# Patient Record
Sex: MALE | Race: BLACK OR AFRICAN AMERICAN | NOT HISPANIC OR LATINO | Employment: UNEMPLOYED | URBAN - METROPOLITAN AREA
[De-identification: names, ages, dates, MRNs, and addresses within clinical notes are randomized per-mention and may not be internally consistent; named-entity substitution may affect disease eponyms.]

---

## 2023-08-01 ENCOUNTER — HOSPITAL ENCOUNTER (EMERGENCY)
Facility: HOSPITAL | Age: 33
Discharge: HOME | End: 2023-08-01
Attending: EMERGENCY MEDICINE
Payer: MEDICAID

## 2023-08-01 VITALS
DIASTOLIC BLOOD PRESSURE: 72 MMHG | SYSTOLIC BLOOD PRESSURE: 112 MMHG | HEIGHT: 68 IN | BODY MASS INDEX: 30.31 KG/M2 | TEMPERATURE: 98 F | RESPIRATION RATE: 16 BRPM | WEIGHT: 200 LBS | HEART RATE: 76 BPM | OXYGEN SATURATION: 100 %

## 2023-08-01 DIAGNOSIS — R19.7 DIARRHEA, UNSPECIFIED TYPE: ICD-10-CM

## 2023-08-01 DIAGNOSIS — R10.9 ABDOMINAL PAIN, UNSPECIFIED ABDOMINAL LOCATION: Primary | ICD-10-CM

## 2023-08-01 LAB
ALBUMIN SERPL BCP-MCNC: 4.2 G/DL (ref 3.5–5.2)
ALP SERPL-CCNC: 71 U/L (ref 55–135)
ALT SERPL W/O P-5'-P-CCNC: 27 U/L (ref 10–44)
ANION GAP SERPL CALC-SCNC: 9 MMOL/L (ref 8–16)
AST SERPL-CCNC: 23 U/L (ref 10–40)
BASOPHILS # BLD AUTO: 0.05 K/UL (ref 0–0.2)
BASOPHILS NFR BLD: 1 % (ref 0–1.9)
BILIRUB SERPL-MCNC: 0.6 MG/DL (ref 0.1–1)
BUN SERPL-MCNC: 17 MG/DL (ref 6–20)
CALCIUM SERPL-MCNC: 9.2 MG/DL (ref 8.7–10.5)
CHLORIDE SERPL-SCNC: 105 MMOL/L (ref 95–110)
CO2 SERPL-SCNC: 26 MMOL/L (ref 23–29)
CREAT SERPL-MCNC: 1.2 MG/DL (ref 0.5–1.4)
CRP SERPL-MCNC: 8.5 MG/L (ref 0–8.2)
DIFFERENTIAL METHOD: NORMAL
EOSINOPHIL # BLD AUTO: 0.1 K/UL (ref 0–0.5)
EOSINOPHIL NFR BLD: 1.7 % (ref 0–8)
ERYTHROCYTE [DISTWIDTH] IN BLOOD BY AUTOMATED COUNT: 13.2 % (ref 11.5–14.5)
EST. GFR  (NO RACE VARIABLE): >60 ML/MIN/1.73 M^2
GLUCOSE SERPL-MCNC: 72 MG/DL (ref 70–110)
HCT VFR BLD AUTO: 46.8 % (ref 40–54)
HGB BLD-MCNC: 15.8 G/DL (ref 14–18)
IMM GRANULOCYTES # BLD AUTO: 0.01 K/UL (ref 0–0.04)
IMM GRANULOCYTES NFR BLD AUTO: 0.2 % (ref 0–0.5)
LIPASE SERPL-CCNC: 38 U/L (ref 4–60)
LYMPHOCYTES # BLD AUTO: 2.3 K/UL (ref 1–4.8)
LYMPHOCYTES NFR BLD: 43.2 % (ref 18–48)
MCH RBC QN AUTO: 30.9 PG (ref 27–31)
MCHC RBC AUTO-ENTMCNC: 33.8 G/DL (ref 32–36)
MCV RBC AUTO: 92 FL (ref 82–98)
MONOCYTES # BLD AUTO: 0.6 K/UL (ref 0.3–1)
MONOCYTES NFR BLD: 11.2 % (ref 4–15)
NEUTROPHILS # BLD AUTO: 2.2 K/UL (ref 1.8–7.7)
NEUTROPHILS NFR BLD: 42.7 % (ref 38–73)
NRBC BLD-RTO: 0 /100 WBC
PLATELET # BLD AUTO: 180 K/UL (ref 150–450)
PMV BLD AUTO: 11.3 FL (ref 9.2–12.9)
POTASSIUM SERPL-SCNC: 3.6 MMOL/L (ref 3.5–5.1)
PROT SERPL-MCNC: 7.7 G/DL (ref 6–8.4)
RBC # BLD AUTO: 5.11 M/UL (ref 4.6–6.2)
SODIUM SERPL-SCNC: 140 MMOL/L (ref 136–145)
WBC # BLD AUTO: 5.25 K/UL (ref 3.9–12.7)

## 2023-08-01 PROCEDURE — 80053 COMPREHEN METABOLIC PANEL: CPT | Performed by: PHYSICIAN ASSISTANT

## 2023-08-01 PROCEDURE — 63600175 PHARM REV CODE 636 W HCPCS: Performed by: EMERGENCY MEDICINE

## 2023-08-01 PROCEDURE — 83690 ASSAY OF LIPASE: CPT | Performed by: PHYSICIAN ASSISTANT

## 2023-08-01 PROCEDURE — 86140 C-REACTIVE PROTEIN: CPT | Performed by: PHYSICIAN ASSISTANT

## 2023-08-01 PROCEDURE — 25000003 PHARM REV CODE 250: Performed by: EMERGENCY MEDICINE

## 2023-08-01 PROCEDURE — 85025 COMPLETE CBC W/AUTO DIFF WBC: CPT | Performed by: PHYSICIAN ASSISTANT

## 2023-08-01 PROCEDURE — 99284 EMERGENCY DEPT VISIT MOD MDM: CPT

## 2023-08-01 PROCEDURE — 96374 THER/PROPH/DIAG INJ IV PUSH: CPT

## 2023-08-01 RX ORDER — ONDANSETRON 2 MG/ML
4 INJECTION INTRAMUSCULAR; INTRAVENOUS
Status: COMPLETED | OUTPATIENT
Start: 2023-08-01 | End: 2023-08-01

## 2023-08-01 RX ORDER — ONDANSETRON 4 MG/1
4 TABLET, ORALLY DISINTEGRATING ORAL 3 TIMES DAILY PRN
Qty: 12 TABLET | Refills: 0 | Status: SHIPPED | OUTPATIENT
Start: 2023-08-01

## 2023-08-01 RX ADMIN — SODIUM CHLORIDE 1000 ML: 9 INJECTION, SOLUTION INTRAVENOUS at 03:08

## 2023-08-01 RX ADMIN — ONDANSETRON HYDROCHLORIDE 4 MG: 2 SOLUTION INTRAMUSCULAR; INTRAVENOUS at 03:08

## 2023-08-01 NOTE — ED PROVIDER NOTES
Encounter Date: 8/1/2023       History     Chief Complaint   Patient presents with    Abdominal Pain     Umbilical, abd pain with n/v+ and diarrhea since this am. No distress now; 4/10 pain.     Back Pain     Lumbar, back pain x1 month that has worsened over time and not responding to aleeve. 4/10 pain.      HPI  Shimon is a 32-year-old male who presents with periumbilical abdominal pain, one episode of NBNB vomiting and 5 episodes of nonbloody diarrhea that started today.  No known sick contacts.  Pain is not severe.  No fever or chills.  No recent international travel.  Has not been eating or drinking from abnormal sources for him.  Denies dysuria.    Also complains of a lump on his back that hurts slightly that has been there for months, no worse today than usual.  Review of patient's allergies indicates:  No Known Allergies  No past medical history on file.  No past surgical history on file.  No family history on file.     Review of Systems    Physical Exam     Initial Vitals [08/01/23 1351]   BP Pulse Resp Temp SpO2   (!) 144/69 84 20 98.2 °F (36.8 °C) 98 %      MAP       --         Physical Exam  General: Awake and alert, well-nourished  HENT: moist mucous membranes  Eyes: No conjunctival injection  Pulm: CTAB, no increased work of breathing  CV: Regular rate and rhythm, no murmur noted  Abdomen: Nondistended, TTP in periumbilical region, mild RLQ tenderness, no pain with heel strike or hopping.   MSK: No LE edema, no midline spinal tenderness  Skin: No rash noted, he has a 2x2cm mobile rubbery subcutaneous spherical mass near the mid thoracic spine.  Most likely a dermoid cyst or possibly lipoma, it is not erythematous or significantly tender.  Neuro: No facial asymmetry, grossly normal movements of arms and legs  Psychiatric: Cooperative    ED Course   Procedures  Labs Reviewed   C-REACTIVE PROTEIN - Abnormal; Notable for the following components:       Result Value    CRP 8.5 (*)     All other components  within normal limits   CBC W/ AUTO DIFFERENTIAL   COMPREHENSIVE METABOLIC PANEL   LIPASE   C-REACTIVE PROTEIN          Imaging Results    None          Medications   sodium chloride 0.9% bolus 1,000 mL 1,000 mL (1,000 mLs Intravenous New Bag 8/1/23 1540)   ondansetron injection 4 mg (4 mg Intravenous Given 8/1/23 1539)     Medical Decision Making:   Differential Diagnosis:   Gastroenteritis, mesenteric adenitis, appendicitis, SBO unlikely,  biliary pathology, UTI, pancreatitis  ED Management:  Pt overall well appearing, only mild tenderness of abdomen, mostly in periumbilical area.  Vitals reassuring.  Clinical picture most consistent with gastroenteritis.  However given mild RLQ tenderness will do labs to further eval for possibility of early appendicitis.  CBC, CMP, lipase, CRP overall reassuring.  Pt feels well to go home.  Zofran prescription given.  Recommended repeat eval tomorrow by a physician if pain continues to ensure not early appendicitis.  Return precautions given.                           Clinical Impression:   Final diagnoses:  [R10.9] Abdominal pain, unspecified abdominal location (Primary)  [R19.7] Diarrhea, unspecified type        ED Disposition Condition    Discharge Stable          ED Prescriptions       Medication Sig Dispense Start Date End Date Auth. Provider    ondansetron (ZOFRAN-ODT) 4 MG TbDL Take 1 tablet (4 mg total) by mouth 3 (three) times daily as needed (nausea). 12 tablet 8/1/2023 -- Roderick Posadas MD          Follow-up Information       Follow up With Specialties Details Why Contact Info    [facility]  Call  Option for a primary care doctor if you do not have one.  Call to schedule an appointment as soon as possible. [id number] [facility] [id number]  [phone]               Roderick Posadas MD  08/02/23 6877

## 2023-08-01 NOTE — Clinical Note
"Shimon Grossmanargenis Morin was seen and treated in our emergency department on 8/1/2023.  He may return to work on 08/02/2023.       If you have any questions or concerns, please don't hesitate to call.      Roderick Posadas MD"

## 2023-08-01 NOTE — ED TRIAGE NOTES
Pt arrived with c/o abd pain with emesis since last night and non-radiating middle back pain starting this AM.  Pt reports abd pain is sharp in nature, located in epigastric region.  Pt reports about 4 or 5 episodes of loose stool, but diarrhea has since then been relieved.  Pt denies any fever.   Pt denies any recent falls, trauma, or heavy lifting.  Pt took Aleve with some relief.  Pt answering questions appropriately, speaking in complete sentences, respirations even and unlabored.  Aao x 4.

## 2023-08-01 NOTE — FIRST PROVIDER EVALUATION
Emergency Department TeleTriage Encounter Note      CHIEF COMPLAINT    Chief Complaint   Patient presents with    Abdominal Pain     Umbilical, abd pain with n/v+ and diarrhea since this am. No distress now; 4/10 pain.     Back Pain     Lumbar, back pain x1 month that has worsened over time and not responding to aleeve. 4/10 pain.        VITAL SIGNS   Initial Vitals [08/01/23 1351]   BP Pulse Resp Temp SpO2   (!) 144/69 84 20 98.2 °F (36.8 °C) 98 %      MAP       --            ALLERGIES    Review of patient's allergies indicates:  No Known Allergies    PROVIDER TRIAGE NOTE  Patient presents with complaint of N/V/D that started today with associated abdominal pain. Reports no urinary symptoms. Intermittent back pain for one month. No injury.      Phy:   Constitutional: well nourished, well developed, appearing stated age, NAD   HEENT: NCAT, symmetrical lids, No obvious facial deformity.  Normal phonation. Normal Conjunctiva   Neck: NAROM   Respiratory: Normal effort.  No obvious use of accessory muscles   Musculoskeletal: Moved upper extremities well   Neuro: Alert, answers questions appropriately    Psych: appropriate mood and affect      Initial orders will be placed and care will be transferred to an alternate provider when patient is roomed for a full evaluation. Any additional orders and the final disposition will be determined by that provider.        ORDERS  Labs Reviewed   CBC W/ AUTO DIFFERENTIAL   COMPREHENSIVE METABOLIC PANEL   LIPASE       ED Orders (720h ago, onward)      Start Ordered     Status Ordering Provider    08/01/23 1436 08/01/23 1435  Vital signs  Every 2 hours         Ordered SHEEBA SIMMONS    08/01/23 1436 08/01/23 1435  Diet NPO  Diet effective now         Ordered SHEEBA SIMMONS    08/01/23 1436 08/01/23 1435  Insert peripheral IV  Once         Ordered SHEEBA SIMMONS    08/01/23 1436 08/01/23 1435  CBC W/ AUTO DIFFERENTIAL  STAT         Ordered  GARCIAALEXA GEORGIANA SHEEBA    08/01/23 1436 08/01/23 1435  Comp. Metabolic Panel  STAT         Ordered MARKY STONER SHEEBA    08/01/23 1436 08/01/23 1435  Lipase  STAT         Ordered VICTORINA SIMMONSINE              Virtual Visit Note: The provider triage portion of this emergency department evaluation and documentation was performed via [employer], a HIPAA-compliant telemedicine application, in concert with a tele-presenter in the room. A face to face patient evaluation with one of my colleagues will occur once the patient is placed in an emergency department room.      DISCLAIMER: This note was prepared with [employer] voice recognition transcription software. Garbled syntax, mangled pronouns, and other bizarre constructions may be attributed to that software system.